# Patient Record
Sex: MALE | Race: WHITE | Employment: STUDENT | URBAN - METROPOLITAN AREA
[De-identification: names, ages, dates, MRNs, and addresses within clinical notes are randomized per-mention and may not be internally consistent; named-entity substitution may affect disease eponyms.]

---

## 2017-08-08 ENCOUNTER — TELEPHONE (OUTPATIENT)
Dept: GASTROENTEROLOGY | Facility: CLINIC | Age: 22
End: 2017-08-08

## 2017-08-08 ENCOUNTER — OFFICE VISIT (OUTPATIENT)
Dept: GASTROENTEROLOGY | Facility: CLINIC | Age: 22
End: 2017-08-08

## 2017-08-08 ENCOUNTER — LAB ENCOUNTER (OUTPATIENT)
Dept: LAB | Facility: HOSPITAL | Age: 22
End: 2017-08-08
Attending: NURSE PRACTITIONER

## 2017-08-08 VITALS
DIASTOLIC BLOOD PRESSURE: 66 MMHG | HEART RATE: 69 BPM | SYSTOLIC BLOOD PRESSURE: 111 MMHG | WEIGHT: 160.81 LBS | HEIGHT: 67 IN | BODY MASS INDEX: 25.24 KG/M2

## 2017-08-08 DIAGNOSIS — R14.2 BELCHING: ICD-10-CM

## 2017-08-08 DIAGNOSIS — K21.9 GASTROESOPHAGEAL REFLUX DISEASE, ESOPHAGITIS PRESENCE NOT SPECIFIED: ICD-10-CM

## 2017-08-08 DIAGNOSIS — R13.10 DYSPHAGIA, UNSPECIFIED TYPE: Primary | ICD-10-CM

## 2017-08-08 DIAGNOSIS — R11.2 NAUSEA AND VOMITING, INTRACTABILITY OF VOMITING NOT SPECIFIED, UNSPECIFIED VOMITING TYPE: ICD-10-CM

## 2017-08-08 DIAGNOSIS — R10.84 GENERALIZED ABDOMINAL PAIN: ICD-10-CM

## 2017-08-08 DIAGNOSIS — K92.0 HEMATEMESIS, PRESENCE OF NAUSEA NOT SPECIFIED: ICD-10-CM

## 2017-08-08 DIAGNOSIS — R19.4 ALTERED BOWEL HABITS: ICD-10-CM

## 2017-08-08 LAB
ALBUMIN SERPL BCP-MCNC: 4.8 G/DL (ref 3.5–4.8)
ALP SERPL-CCNC: 60 U/L (ref 39–325)
ALT SERPL-CCNC: 20 U/L (ref 17–63)
ANION GAP SERPL CALC-SCNC: 5 MMOL/L (ref 0–18)
AST SERPL-CCNC: 27 U/L (ref 15–41)
BASOPHILS # BLD: 0 K/UL (ref 0–0.2)
BASOPHILS NFR BLD: 1 %
BILIRUB DIRECT SERPL-MCNC: 0.1 MG/DL (ref 0–0.2)
BILIRUB SERPL-MCNC: 1 MG/DL (ref 0.3–1.2)
BUN SERPL-MCNC: 13 MG/DL (ref 8–20)
BUN/CREAT SERPL: 12.9 (ref 10–20)
CALCIUM SERPL-MCNC: 9.7 MG/DL (ref 8.5–10.5)
CHLORIDE SERPL-SCNC: 105 MMOL/L (ref 95–110)
CO2 SERPL-SCNC: 30 MMOL/L (ref 22–32)
CREAT SERPL-MCNC: 1.01 MG/DL (ref 0.5–1.5)
CRP SERPL-MCNC: 0.5 MG/DL (ref 0–0.9)
EOSINOPHIL # BLD: 0.1 K/UL (ref 0–0.7)
EOSINOPHIL NFR BLD: 1 %
ERYTHROCYTE [DISTWIDTH] IN BLOOD BY AUTOMATED COUNT: 13.2 % (ref 11–15)
ERYTHROCYTE [SEDIMENTATION RATE] IN BLOOD: 5 MM/HR (ref 0–15)
GLUCOSE SERPL-MCNC: 87 MG/DL (ref 70–99)
HCT VFR BLD AUTO: 42.4 % (ref 41–52)
HGB BLD-MCNC: 14.2 G/DL (ref 13.5–17.5)
IGA SERPL-MCNC: 79 MG/DL (ref 68–378)
LIPASE SERPL-CCNC: 24 U/L (ref 22–51)
LYMPHOCYTES # BLD: 2.7 K/UL (ref 1–4)
LYMPHOCYTES NFR BLD: 31 %
MCH RBC QN AUTO: 29.4 PG (ref 27–32)
MCHC RBC AUTO-ENTMCNC: 33.5 G/DL (ref 32–37)
MCV RBC AUTO: 87.8 FL (ref 80–100)
MONOCYTES # BLD: 0.7 K/UL (ref 0–1)
MONOCYTES NFR BLD: 8 %
NEUTROPHILS # BLD AUTO: 5.3 K/UL (ref 1.8–7.7)
NEUTROPHILS NFR BLD: 60 %
OSMOLALITY UR CALC.SUM OF ELEC: 289 MOSM/KG (ref 275–295)
PLATELET # BLD AUTO: 239 K/UL (ref 140–400)
PMV BLD AUTO: 9 FL (ref 7.4–10.3)
POTASSIUM SERPL-SCNC: 4 MMOL/L (ref 3.3–5.1)
PROT SERPL-MCNC: 7.4 G/DL (ref 5.9–8.4)
RBC # BLD AUTO: 4.83 M/UL (ref 4.5–5.9)
SODIUM SERPL-SCNC: 140 MMOL/L (ref 136–144)
WBC # BLD AUTO: 8.8 K/UL (ref 4–11)

## 2017-08-08 PROCEDURE — 80048 BASIC METABOLIC PNL TOTAL CA: CPT

## 2017-08-08 PROCEDURE — 85652 RBC SED RATE AUTOMATED: CPT

## 2017-08-08 PROCEDURE — 83516 IMMUNOASSAY NONANTIBODY: CPT

## 2017-08-08 PROCEDURE — 83690 ASSAY OF LIPASE: CPT

## 2017-08-08 PROCEDURE — 86140 C-REACTIVE PROTEIN: CPT

## 2017-08-08 PROCEDURE — 80076 HEPATIC FUNCTION PANEL: CPT

## 2017-08-08 PROCEDURE — 85025 COMPLETE CBC W/AUTO DIFF WBC: CPT

## 2017-08-08 PROCEDURE — 82784 ASSAY IGA/IGD/IGG/IGM EACH: CPT | Performed by: NURSE PRACTITIONER

## 2017-08-08 PROCEDURE — 99212 OFFICE O/P EST SF 10 MIN: CPT | Performed by: NURSE PRACTITIONER

## 2017-08-08 PROCEDURE — 99204 OFFICE O/P NEW MOD 45 MIN: CPT | Performed by: NURSE PRACTITIONER

## 2017-08-08 PROCEDURE — 36415 COLL VENOUS BLD VENIPUNCTURE: CPT

## 2017-08-08 RX ORDER — NICOTINE POLACRILEX 4 MG/1
1 GUM, CHEWING ORAL DAILY
Qty: 30 TABLET | Refills: 5 | Status: SHIPPED | OUTPATIENT
Start: 2017-08-08

## 2017-08-08 NOTE — H&P
[address] [facility] [address] Gastroenterology                                                                                                  Clinic History and Physical     Pa Edgardo late last year. An ultrasound of the abdomen was performed which per patient, was unremarkable. Patient was placed on Protonix.   He states he had side effects from this medication - he \"felt bad\" and \"passed out during practice\" and felt overal History, Medications, Allergies, ROS:      History reviewed. No pertinent past medical history.    Past Surgical History:  No date: WRIST FRACTURE SURGERY   Family Hx:   Family History   Problem Relation Age of Onset   • Colon Polyps Mother    • Colon cyanosis, clubbing or edema is evident.    Neuro: Alert and oriented x4, and patient is having movements of all 4 extremities   Psych: Pt has a normal mood and affect, behavior is normal    Nursing note and vitals reviewed    Labs/Imaging:     Patient's lab symptoms may be IBS in nature. I have advised the patient to start on the FODMAP diet and keep a food/symptom diary over the course of the next 2-3 months. Discussed celiac versus gluten sensitivity. Patient will also complete labs and stool testing.   I Omeprazole 20 MG Oral Tab EC 30 tablet 5      Sig: Take 1 tablet by mouth daily. Imaging & Referrals:  None       SAMARIA Rodriguez  8/8/2017

## 2017-08-08 NOTE — TELEPHONE ENCOUNTER
Scheduled for:  EGD 13731  Provider Name: Nina Bianchi  Date:   Mon 11/06/17  Location:  [facility]  Sedation:  MAC  Time:  9:00 am  Prep: no solids after midnight and nothing by PO 6 hrs prior to procedure  Meds/Allergies Reconciled?:  NKDA  Diagnosis with codes:  Dysph

## 2017-08-08 NOTE — PATIENT INSTRUCTIONS
1. Schedule upper endoscopy (EGD) with Dr. Joby Lucero w/ MAC  2. Complete labs and stool testing   3. Start omeprazole after completing H Pylori testing - take it first in morning on empty stomach   4. Start FODMAP diet   5.  Keep food/symptom diary for 2-3 josh

## 2017-08-09 LAB — TTG IGA SER-ACNC: <0.1 U/ML (ref ?–7)

## 2017-08-10 ENCOUNTER — TELEPHONE (OUTPATIENT)
Dept: GASTROENTEROLOGY | Facility: CLINIC | Age: 22
End: 2017-08-10

## 2017-08-10 ENCOUNTER — APPOINTMENT (OUTPATIENT)
Dept: LAB | Facility: HOSPITAL | Age: 22
End: 2017-08-10
Attending: NURSE PRACTITIONER

## 2017-08-10 DIAGNOSIS — K21.9 GASTROESOPHAGEAL REFLUX DISEASE, ESOPHAGITIS PRESENCE NOT SPECIFIED: ICD-10-CM

## 2017-08-10 DIAGNOSIS — R14.2 BELCHING: ICD-10-CM

## 2017-08-10 DIAGNOSIS — R11.2 NAUSEA AND VOMITING, INTRACTABILITY OF VOMITING NOT SPECIFIED, UNSPECIFIED VOMITING TYPE: ICD-10-CM

## 2017-08-10 DIAGNOSIS — R10.84 GENERALIZED ABDOMINAL PAIN: ICD-10-CM

## 2017-08-10 PROCEDURE — 87338 HPYLORI STOOL AG IA: CPT

## 2017-08-10 PROCEDURE — 83993 ASSAY FOR CALPROTECTIN FECAL: CPT

## 2017-08-10 NOTE — TELEPHONE ENCOUNTER
----- Message from SAMARIA Olguin sent at 8/9/2017  2:12 PM CDT -----  Nursing: Please let the patient know that his lab work came back negative for celiac, infection, or inflammation.   Will advise on the stool testing once those results have been rec

## 2017-08-11 LAB
CALPROTECTIN, FECAL: <16 UG/G
HELICOBACTER PYLORI AG, FECAL: NEGATIVE

## 2017-08-14 ENCOUNTER — TELEPHONE (OUTPATIENT)
Dept: GASTROENTEROLOGY | Facility: CLINIC | Age: 22
End: 2017-08-14

## 2017-08-22 ENCOUNTER — TELEPHONE (OUTPATIENT)
Dept: GASTROENTEROLOGY | Facility: CLINIC | Age: 22
End: 2017-08-22

## 2017-08-22 NOTE — TELEPHONE ENCOUNTER
Pt's mother returned my call, stating that her son is on the road.  I informed Alissa Dudley, pt's mother, that Tyrone's lab results came back normal.   Ms Alissa Dudley stated that her son only took Omeprazole for two days because it gave him fever and fatigue, and th

## 2017-08-22 NOTE — TELEPHONE ENCOUNTER
I called and left a voicemail message for pt to return my call regarding his lab results.     Alirio Goff

## 2017-08-22 NOTE — TELEPHONE ENCOUNTER
----- Message from SAMARIA Bagley sent at 8/14/2017  1:54 PM CDT -----  Nursing: Please let the patient know that his stool testing putting H pylori and fecal calprotectin were negative.   These would have indicated an infection in the stomach or infla

## 2017-08-31 NOTE — TELEPHONE ENCOUNTER
As Sary Barbosa is out of the office presently, I suggest Ranitidine. Patient seems to have \"side effects\" to Omeprazole and Pantoprazole. These are not common side effects of these medications. He can try this over the counter regimen.

## 2017-08-31 NOTE — TELEPHONE ENCOUNTER
Contacted pt mother, Veronica Hammonds, and reviewed PB's message below, she verbalized understanding and will purchase OTC ranitidine for the pt. No further questions or concerns at this time.

## 2017-09-01 ENCOUNTER — TELEPHONE (OUTPATIENT)
Dept: GASTROENTEROLOGY | Facility: CLINIC | Age: 22
End: 2017-09-01

## 2017-09-01 ENCOUNTER — HOSPITAL ENCOUNTER (EMERGENCY)
Facility: HOSPITAL | Age: 22
Discharge: HOME | End: 2017-09-01
Attending: EMERGENCY MEDICINE

## 2017-09-01 VITALS
HEART RATE: 63 BPM | DIASTOLIC BLOOD PRESSURE: 96 MMHG | BODY MASS INDEX: 25.11 KG/M2 | TEMPERATURE: 99 F | WEIGHT: 160 LBS | RESPIRATION RATE: 14 BRPM | OXYGEN SATURATION: 98 % | HEIGHT: 67 IN | SYSTOLIC BLOOD PRESSURE: 134 MMHG

## 2017-09-01 DIAGNOSIS — R10.13 ABDOMINAL PAIN, EPIGASTRIC: Primary | ICD-10-CM

## 2017-09-01 LAB
ANION GAP SERPL CALC-SCNC: 8 MMOL/L (ref 0–18)
BASOPHILS # BLD: 0 K/UL (ref 0–0.2)
BASOPHILS NFR BLD: 0 %
BUN SERPL-MCNC: 15 MG/DL (ref 8–20)
BUN/CREAT SERPL: 13.8 (ref 10–20)
CALCIUM SERPL-MCNC: 9.8 MG/DL (ref 8.5–10.5)
CHLORIDE SERPL-SCNC: 102 MMOL/L (ref 95–110)
CO2 SERPL-SCNC: 30 MMOL/L (ref 22–32)
CREAT SERPL-MCNC: 1.09 MG/DL (ref 0.5–1.5)
EOSINOPHIL # BLD: 0 K/UL (ref 0–0.7)
EOSINOPHIL NFR BLD: 0 %
ERYTHROCYTE [DISTWIDTH] IN BLOOD BY AUTOMATED COUNT: 12.9 % (ref 11–15)
GLUCOSE SERPL-MCNC: 98 MG/DL (ref 70–99)
HCT VFR BLD AUTO: 41.5 % (ref 41–52)
HGB BLD-MCNC: 14.1 G/DL (ref 13.5–17.5)
LIPASE SERPL-CCNC: 24 U/L (ref 22–51)
LYMPHOCYTES # BLD: 1.9 K/UL (ref 1–4)
LYMPHOCYTES NFR BLD: 21 %
MCH RBC QN AUTO: 29.3 PG (ref 27–32)
MCHC RBC AUTO-ENTMCNC: 34 G/DL (ref 32–37)
MCV RBC AUTO: 86.1 FL (ref 80–100)
MONOCYTES # BLD: 0.8 K/UL (ref 0–1)
MONOCYTES NFR BLD: 8 %
NEUTROPHILS # BLD AUTO: 6.7 K/UL (ref 1.8–7.7)
NEUTROPHILS NFR BLD: 71 %
OSMOLALITY UR CALC.SUM OF ELEC: 291 MOSM/KG (ref 275–295)
PLATELET # BLD AUTO: 233 K/UL (ref 140–400)
PMV BLD AUTO: 8.6 FL (ref 7.4–10.3)
POTASSIUM SERPL-SCNC: 3.7 MMOL/L (ref 3.3–5.1)
RBC # BLD AUTO: 4.81 M/UL (ref 4.5–5.9)
SODIUM SERPL-SCNC: 140 MMOL/L (ref 136–144)
TROPONIN I SERPL-MCNC: 0.01 NG/ML (ref ?–0.03)
WBC # BLD AUTO: 9.5 K/UL (ref 4–11)

## 2017-09-01 PROCEDURE — 93005 ELECTROCARDIOGRAM TRACING: CPT

## 2017-09-01 PROCEDURE — 83690 ASSAY OF LIPASE: CPT | Performed by: EMERGENCY MEDICINE

## 2017-09-01 PROCEDURE — 99284 EMERGENCY DEPT VISIT MOD MDM: CPT

## 2017-09-01 PROCEDURE — 93010 ELECTROCARDIOGRAM REPORT: CPT | Performed by: EMERGENCY MEDICINE

## 2017-09-01 PROCEDURE — 80048 BASIC METABOLIC PNL TOTAL CA: CPT

## 2017-09-01 PROCEDURE — 36415 COLL VENOUS BLD VENIPUNCTURE: CPT

## 2017-09-01 PROCEDURE — 80048 BASIC METABOLIC PNL TOTAL CA: CPT | Performed by: EMERGENCY MEDICINE

## 2017-09-01 PROCEDURE — 85025 COMPLETE CBC W/AUTO DIFF WBC: CPT | Performed by: EMERGENCY MEDICINE

## 2017-09-01 PROCEDURE — 85025 COMPLETE CBC W/AUTO DIFF WBC: CPT

## 2017-09-01 PROCEDURE — 84484 ASSAY OF TROPONIN QUANT: CPT | Performed by: EMERGENCY MEDICINE

## 2017-09-01 PROCEDURE — 84484 ASSAY OF TROPONIN QUANT: CPT

## 2017-09-01 RX ORDER — MAGNESIUM HYDROXIDE/ALUMINUM HYDROXICE/SIMETHICONE 120; 1200; 1200 MG/30ML; MG/30ML; MG/30ML
30 SUSPENSION ORAL ONCE
Status: COMPLETED | OUTPATIENT
Start: 2017-09-01 | End: 2017-09-01

## 2017-09-01 RX ORDER — ONDANSETRON 2 MG/ML
4 INJECTION INTRAMUSCULAR; INTRAVENOUS ONCE
Status: DISCONTINUED | OUTPATIENT
Start: 2017-09-01 | End: 2017-09-01

## 2017-09-01 RX ORDER — FAMOTIDINE 20 MG/1
20 TABLET ORAL ONCE
Status: COMPLETED | OUTPATIENT
Start: 2017-09-01 | End: 2017-09-01

## 2017-09-01 NOTE — ED INITIAL ASSESSMENT (HPI)
CP that started tonight- 2hrs pta, patient denies any trauma-  +SOB, pain is reproduceable with inhalation.   Worse after eating pizza tonight

## 2017-09-01 NOTE — ED PROVIDER NOTES
Patient Seen in: [facility] AND [facility] Emergency Department    History   Patient presents with:  Chest Pain Angina (cardiovascular)  Abdomen/Flank Pain (GI/)    Stated Complaint: chest pain    HPI    Patient is a 45-year-old male who presents to the emerge are normal. Pupils are equal, round, and reactive to light. Neck: Neck supple. Cardiovascular: Normal rate, regular rhythm and normal heart sounds. Pulmonary/Chest: Effort normal.   Abdominal: Soft.  Normal appearance and bowel sounds are normal. He [address] [place] [address]  [phone]    Schedule an appointment as soon as possible for a visit        Medications Prescribed:  Current Discharge Medication List

## 2017-09-01 NOTE — TELEPHONE ENCOUNTER
Pts mother Madai Daley states that pt was in ER at [address] [place] [address] last night due to abdominal pain and breathing issues and was told that he can't wait until 11/6/17 to have EGD. He was also advised to have colonoscopy done at the same time. Please call.

## 2017-09-01 NOTE — TELEPHONE ENCOUNTER
Dr. Marj Chacko -  Would you like pt to have ER follow-up OV with you, Dax or move up EGD (add colon?)    1) Pt had OV with  [address] on 8.8. 17.  See notes  2) Pt was in ER this morning for upper GI symptoms

## 2017-09-01 NOTE — TELEPHONE ENCOUNTER
FLORINDA/CSS - ok to transfer to RNs. When I phoned mom back at # provided, it was a work line. General message left to call back. Phoned pt and he stated verbal \"ok to talk to mom\". GI RNs - Pls see below. 1) I scheduled pt with Burke Reed for Wed 9.

## 2017-09-01 NOTE — TELEPHONE ENCOUNTER
RN to contact, note from Coco reviewed. Ok to move up the EGD but why does the pt need colonoscopy -  OK to have pt see Coco next week, ok to add to her schedule.

## 2017-11-06 ENCOUNTER — TELEPHONE (OUTPATIENT)
Dept: GASTROENTEROLOGY | Facility: CLINIC | Age: 22
End: 2017-11-06

## 2017-11-06 NOTE — TELEPHONE ENCOUNTER
Spoke with Bj in [place] that pt is cancelling. Removed from EMR        I called pt this morning. He does not want to reschedule at this time. He states issues with insurance and planning on following up with provider closer to his home in [place].     FYI

## 2017-11-06 NOTE — TELEPHONE ENCOUNTER
Noted.  Arabella Warren to send patient's records to his new gastroenterology office when he establishes.

## 2019-01-15 ENCOUNTER — APPOINTMENT (OUTPATIENT)
Dept: GENERAL RADIOLOGY | Facility: HOSPITAL | Age: 24
End: 2019-01-15
Attending: PHYSICIAN ASSISTANT
Payer: COMMERCIAL

## 2019-01-15 ENCOUNTER — HOSPITAL ENCOUNTER (EMERGENCY)
Facility: HOSPITAL | Age: 24
Discharge: HOME | End: 2019-01-15
Payer: COMMERCIAL

## 2019-01-15 VITALS
DIASTOLIC BLOOD PRESSURE: 72 MMHG | TEMPERATURE: 98 F | WEIGHT: 155 LBS | OXYGEN SATURATION: 97 % | HEART RATE: 70 BPM | HEIGHT: 67 IN | BODY MASS INDEX: 24.33 KG/M2 | RESPIRATION RATE: 16 BRPM | SYSTOLIC BLOOD PRESSURE: 122 MMHG

## 2019-01-15 DIAGNOSIS — R07.89 CHEST WALL PAIN: Primary | ICD-10-CM

## 2019-01-15 LAB
ANION GAP SERPL CALC-SCNC: 13 MMOL/L (ref 0–18)
BASOPHILS # BLD: 0 K/UL (ref 0–0.2)
BASOPHILS NFR BLD: 1 %
BUN SERPL-MCNC: 13 MG/DL (ref 8–20)
BUN/CREAT SERPL: 13.4 (ref 10–20)
CALCIUM SERPL-MCNC: 9.6 MG/DL (ref 8.5–10.5)
CHLORIDE SERPL-SCNC: 99 MMOL/L (ref 95–110)
CO2 SERPL-SCNC: 26 MMOL/L (ref 22–32)
CREAT SERPL-MCNC: 0.97 MG/DL (ref 0.5–1.5)
D DIMER PPP FEU-MCNC: <0.27 MCG/ML (ref ?–0.5)
EOSINOPHIL # BLD: 0 K/UL (ref 0–0.7)
EOSINOPHIL NFR BLD: 0 %
ERYTHROCYTE [DISTWIDTH] IN BLOOD BY AUTOMATED COUNT: 12.7 % (ref 11–15)
GLUCOSE SERPL-MCNC: 92 MG/DL (ref 70–99)
HCT VFR BLD AUTO: 42.2 % (ref 41–52)
HGB BLD-MCNC: 14.3 G/DL (ref 13.5–17.5)
LYMPHOCYTES # BLD: 1.6 K/UL (ref 1–4)
LYMPHOCYTES NFR BLD: 21 %
MCH RBC QN AUTO: 29.6 PG (ref 27–32)
MCHC RBC AUTO-ENTMCNC: 33.9 G/DL (ref 32–37)
MCV RBC AUTO: 87.1 FL (ref 80–100)
MONOCYTES # BLD: 0.7 K/UL (ref 0–1)
MONOCYTES NFR BLD: 9 %
NEUTROPHILS # BLD AUTO: 5.3 K/UL (ref 1.8–7.7)
NEUTROPHILS NFR BLD: 69 %
OSMOLALITY UR CALC.SUM OF ELEC: 286 MOSM/KG (ref 275–295)
PLATELET # BLD AUTO: 232 K/UL (ref 140–400)
PMV BLD AUTO: 8.2 FL (ref 7.4–10.3)
POTASSIUM SERPL-SCNC: 3.9 MMOL/L (ref 3.3–5.1)
RBC # BLD AUTO: 4.84 M/UL (ref 4.5–5.9)
SODIUM SERPL-SCNC: 138 MMOL/L (ref 136–144)
TROPONIN I SERPL-MCNC: 0 NG/ML (ref ?–0.03)
WBC # BLD AUTO: 7.6 K/UL (ref 4–11)

## 2019-01-15 PROCEDURE — 99285 EMERGENCY DEPT VISIT HI MDM: CPT

## 2019-01-15 PROCEDURE — 36415 COLL VENOUS BLD VENIPUNCTURE: CPT

## 2019-01-15 PROCEDURE — 93005 ELECTROCARDIOGRAM TRACING: CPT

## 2019-01-15 PROCEDURE — 85025 COMPLETE CBC W/AUTO DIFF WBC: CPT

## 2019-01-15 PROCEDURE — 84484 ASSAY OF TROPONIN QUANT: CPT

## 2019-01-15 PROCEDURE — 85379 FIBRIN DEGRADATION QUANT: CPT | Performed by: NURSE PRACTITIONER

## 2019-01-15 PROCEDURE — 80048 BASIC METABOLIC PNL TOTAL CA: CPT

## 2019-01-15 PROCEDURE — 71046 X-RAY EXAM CHEST 2 VIEWS: CPT | Performed by: PHYSICIAN ASSISTANT

## 2019-01-15 PROCEDURE — 93010 ELECTROCARDIOGRAM REPORT: CPT | Performed by: PHYSICIAN ASSISTANT

## 2019-01-15 RX ORDER — POLYETHYLENE GLYCOL 3350 17 G/17G
17 POWDER, FOR SOLUTION ORAL DAILY PRN
Qty: 12 EACH | Refills: 0 | Status: SHIPPED | OUTPATIENT
Start: 2019-01-15 | End: 2019-01-15 | Stop reason: CLARIF

## 2019-01-15 NOTE — ED PROVIDER NOTES
Patient Seen in: [facility] AND [facility] Emergency Department    History   Patient presents with:  Chest Pain Angina (cardiovascular)  Dyspnea SIRIA SOB (respiratory)    Stated Complaint:     HPI    Patient complains of intermittent chest tighness, and sob that chest pain, no tenderness on palpation   GI: abdomen is soft and non tender, no masses, nl bowel sounds   EXTREMITIES: from, 5/5 strength in all 4 ext, no edema  NEURO: alert and oriented x3, 2-12 intact, no focal deficit noted  SKIN: good skin turgor, no List

## 2019-01-15 NOTE — ED NOTES
Pt to ER with c/o left side chest pain for last 2 weeks. Pt states intermittent non productive cough. Pt states +pain with inspiration. Pt denies fever. No respiratory distress noted. 100% on room air. Lungs clear bilaterally.  Pt states recent flight to [facility]

## 2019-01-15 NOTE — ED INITIAL ASSESSMENT (HPI)
Chest tightness since Thursday, worse with exertion  KRISHNAMURTHY with talking and playing sports  No cardiac hx

## 2019-12-16 NOTE — TELEPHONE ENCOUNTER
----- Message from SAMARIA Jin sent at 8/14/2017  1:54 PM CDT -----  Nursing: Please let the patient know that his stool testing putting H pylori and fecal calprotectin were negative.   These would have indicated an infection in the stomach or infla Yes

## 2024-06-06 ENCOUNTER — TELEPHONE (OUTPATIENT)
Dept: OTHER | Age: 29
End: 2024-06-06

## 2024-07-02 ENCOUNTER — OFFICE VISIT (OUTPATIENT)
Dept: OTOLARYNGOLOGY | Facility: CLINIC | Age: 29
End: 2024-07-02
Payer: COMMERCIAL

## 2024-07-02 DIAGNOSIS — J34.2 DEVIATED NASAL SEPTUM: Primary | ICD-10-CM

## 2024-07-02 DIAGNOSIS — R07.0 THROAT PAIN: ICD-10-CM

## 2024-07-02 PROCEDURE — 31575 DIAGNOSTIC LARYNGOSCOPY: CPT | Performed by: OTOLARYNGOLOGY

## 2024-07-02 PROCEDURE — 99203 OFFICE O/P NEW LOW 30 MIN: CPT | Performed by: OTOLARYNGOLOGY

## 2024-07-02 RX ORDER — AZELASTINE 1 MG/ML
2 SPRAY, METERED NASAL 2 TIMES DAILY
Qty: 30 ML | Refills: 0 | Status: SHIPPED | OUTPATIENT
Start: 2024-07-02

## 2024-07-02 RX ORDER — BUSPIRONE HYDROCHLORIDE 5 MG/1
TABLET ORAL AS DIRECTED
COMMUNITY
Start: 2022-05-25

## 2024-07-02 RX ORDER — MONTELUKAST SODIUM 10 MG/1
10 TABLET ORAL NIGHTLY
Qty: 30 TABLET | Refills: 3 | Status: SHIPPED | OUTPATIENT
Start: 2024-07-02

## 2024-07-02 RX ORDER — OMEPRAZOLE 40 MG/1
40 CAPSULE, DELAYED RELEASE ORAL DAILY
Qty: 30 CAPSULE | Refills: 2 | Status: SHIPPED | OUTPATIENT
Start: 2024-07-02

## 2024-07-02 NOTE — PROGRESS NOTES
Tyrone Jordan is a 29 year old male.    Chief Complaint   Patient presents with    Throat Problem     Patient reports issues swallowing x 1 year. 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  He is unclear where the stricture was as this was done in [place].  No problems since that time up until about a year ago when he started having sensations of difficulty swallowing.  Feels like it is hard to move the food through his throat but it does pass.  He does clear his throat significantly with no significant coughing.  He was on omeprazole 20 mg in the past but no longer using it.  He does admit to occasional GE RD issues especially when he has certain types of beer.  He did play lacrosse and states that he has injured his nose on multiple occasions and noticed that he most likely has a deviated septum to the left.  Some difficulty breathing at times.  He does admit to congestion and postnasal drip      Social History     Socioeconomic History    Marital status:    Tobacco Use    Smoking status: Former     Types: Cigarettes    Smokeless tobacco: Never   Vaping Use    Vaping status: Former   Substance and Sexual Activity    Alcohol use: No    Drug use: Not Currently     Types: Cannabis       Family History   Problem Relation Age of Onset    Colon Polyps Mother     Colon Cancer Paternal Grandmother        History reviewed. No pertinent past medical history.    Past Surgical History:   Procedure Laterality Date    Wrist fracture surgery           REVIEW OF SYSTEMS    System Neg/Pos Details   Constitutional Negative Fatigue, fever and weight loss.   ENMT Negative Drooling.   Eyes Negative Blurred  vision and vision changes.   Respiratory Negative Dyspnea and wheezing.   Cardio Negative Chest pain, irregular heartbeat/palpitations and syncope.   GI Negative Abdominal pain and diarrhea.   Endocrine Negative Cold intolerance and heat intolerance.   Neuro Negative Tremors.   Psych Negative Anxiety and depression.   Integumentary Negative Frequent skin infections, pigment change and rash.   Hema/Lymph Negative Easy bleeding and easy bruising.           PHYSICAL EXAM    There were no vitals taken for this visit.       Constitutional Normal Overall appearance - Normal.   Psychiatric Normal Orientation - Oriented to time, place, person & situation. Appropriate mood and affect.   Neck Exam Normal Inspection - Normal. Palpation - Normal. Parotid gland - Normal. Thyroid gland - Normal.   Eyes Normal Conjunctiva - Right: Normal, Left: Normal. Pupil - Right: Normal, Left: Normal. Fundus - Right: Normal, Left: Normal.   Neurological Normal Memory - Normal. Cranial nerves - Cranial nerves II through XII grossly intact.   Head/Face Normal Facial features - Normal. Eyebrows - Normal. Skull - Normal.        Nasopharynx Normal External nose - Normal. Lips/teeth/gums - Normal. Tonsils - Normal. Oropharynx - Normal.   Ears Normal Inspection - Right: Normal, Left: Normal. Canal - Right: Normal, Left: Normal. TM - Right: Normal, Left: Normal.   Skin Normal Inspection - Normal.        Lymph Detail Normal Submental. Submandibular. Anterior cervical. Posterior cervical. Supraclavicular.        Nose/Mouth/Throat Normal External nose - Normal. Lips/teeth/gums - Normal. Tonsils - Normal. Oropharynx - Normal.   Nose/Mouth/Throat Normal Nares - Right: Normal Left: Normal. Septum -deviated to the left turbinates - Right: Normal, Left: Normal.  Nasal mucosa-congested bilaterally   Procedures:  Endoscopy/Laryngoscopy  Pre-Procedure Care: Verbal consent was obtained. Procedure/risks were explained. Questions were answered. Correct patient  identified. Correct side and site confirmed.      A topical spray of ).25% Neosynephrine was sprayed into the nose.    Laryngoscopy:  Flexible Fiberoptic Laryngoscopy: A diagnostic flexible fiberoptic laryngoscopy was performed. The flexible fiberoptic laryngoscope was placed into the nose or mouthand advanced  into the interior of the larynx. A thorough examination of the interior of the larynx was performed.   Findings were as follows.       Hypopharynx/Larynx:  Epiglottis is normal.  Arytenoids:  Bilateral: Arytenoids are normal.  Vocal folds-false  Bilateral: Vocal folds (false) are normal.  Vocal folds-true  Bilateral: Vocal folds (true) are normal.  Pyriform sinus:  Bilateral: Pyriform sinuses are normal.  Base of tongue is normal in appearance.  There is no airway obstruction.   General comments: Deviated septum to the left nasal mucosal congestion with postnasal discharge.  Erythema of the laryngeal structures.  No lesions masses polyps or nodules present              Current Outpatient Medications:     busPIRone 5 MG Oral Tab, Take by mouth As Directed., Disp: , Rfl:     Omeprazole 20 MG Oral Tab EC, Take 1 tablet by mouth daily. (Patient not taking: Reported on 7/2/2024), Disp: 30 tablet, Rfl: 5  ASSESSMENT AND PLAN    1. Deviated nasal septum    2. Throat pain  Deviated septum to the left significant nasal mucosal congestion postnasal discharge with erythema of the laryngeal structures.  No lesions masses polyps or nodules of the larynx.  I did recommend that he trial Singulair Loratadine-D Astelin nasal spray and I will put him back on a PPI with omeprazole 40 before his biggest meal of the day.  Return to see me in 1 month.  If no improvement will highly recommend an esophagram to rule out a recurrent stricture of the esophagus        This note was prepared using Dragon Medical voice recognition dictation software. As a result errors may occur. When identified these errors have been corrected. While  every attempt is made to correct errors during dictation discrepancies may still exist    Evelio Loera MD    7/2/2024    9:19 AM

## 2024-07-10 ENCOUNTER — PATIENT MESSAGE (OUTPATIENT)
Dept: OTOLARYNGOLOGY | Facility: CLINIC | Age: 29
End: 2024-07-10

## 2024-07-10 NOTE — TELEPHONE ENCOUNTER
From: Tyrone Jordan  To: Evelio Loera  Sent: 7/10/2024 3:24 PM CDT  Subject: Singulair Side Effects    Hello Dr. Loera,    I have been taking singulair at night for a few nights now and have not been able to sleep. I have been restless, waking up throughout the night, hot flashes, sweating and overall insomnia. I don’t think I should continue taking it at night considering the lack of sleep. What is your recommendation?     Thanks,  Tyrone

## 2024-07-10 NOTE — TELEPHONE ENCOUNTER
Called and spoke to patient, he reports since he started the Montelukast, took for 3 nights, he has been restless, not able to sleep, hot flashes, sweating. He is going to stop taking it.    Dr. Loera, please review and advise, your recommendations for replacement for Montelukast. Thank you.

## 2024-07-15 NOTE — TELEPHONE ENCOUNTER
If the medication only bothered him with his sleep he can try to take it in the morning.  Otherwise I agree that he should simply just stop the medication.  Unfortunately there is no other alternative to this medication.  Continue with all other meds.

## 2024-08-09 RX ORDER — AZELASTINE 1 MG/ML
2 SPRAY, METERED NASAL 2 TIMES DAILY
Qty: 30 ML | Refills: 1 | Status: SHIPPED | OUTPATIENT
Start: 2024-08-09

## 2024-08-13 ENCOUNTER — OFFICE VISIT (OUTPATIENT)
Dept: OTOLARYNGOLOGY | Facility: CLINIC | Age: 29
End: 2024-08-13
Payer: COMMERCIAL

## 2024-08-13 DIAGNOSIS — K22.2 ESOPHAGEAL STRICTURE: Primary | ICD-10-CM

## 2024-08-13 DIAGNOSIS — J34.2 DEVIATED NASAL SEPTUM: ICD-10-CM

## 2024-08-13 DIAGNOSIS — R07.0 THROAT PAIN: ICD-10-CM

## 2024-08-13 PROCEDURE — 99213 OFFICE O/P EST LOW 20 MIN: CPT | Performed by: OTOLARYNGOLOGY

## 2024-08-13 NOTE — PROGRESS NOTES
Tyrone Jordan is a 29 year old male.    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 He is unclear where the stricture was as this was done in [place]. No problems since that time up until about a year ago when he started having sensations of difficulty swallowing. Feels like it is hard to move the food through his throat but it does pass. He does clear his throat significantly with no significant coughing. He was on omeprazole 20 mg in the past but no longer using it. He does admit to occasional GE RD issues especially when he has certain types of beer. He did play lacrosse and states that he has injured his nose on multiple occasions and noticed that he most likely has a deviated septum to the left. Some difficulty breathing at times. He does admit to congestion and postnasal drip     8/13/24 today he brings to me information from previous EGD performed at [facility] in 2018.  This demonstrated a stricture at the GE junction which was dilated.  Occasional episodes of feeling like his food gets caught but since I started him on Claritin-D Singulair Astelin nasal spray and omeprazole he has had 80% resolution of his throat symptoms.  Food seems to pass normally at the level of the throat.  No other signs, symptoms or complaints.  Has some bad dreams and othe side effects of the Singulair therefore is only using Claritin-D Astelin and omeprazole does not feel that he really has any issues with GE RD  Social History     Socioeconomic History    Marital status:    Tobacco Use    Smoking status: Former     Types: Cigarettes    Smokeless tobacco: Never   Vaping Use    Vaping status: Former   Substance  and Sexual Activity    Alcohol use: No    Drug use: Not Currently     Types: Cannabis       Family History   Problem Relation Age of Onset    Colon Polyps Mother     Colon Cancer Paternal Grandmother        History reviewed. No pertinent past medical history.    Past Surgical History:   Procedure Laterality Date    Wrist fracture surgery           REVIEW OF SYSTEMS    System Neg/Pos Details   Constitutional Negative Fatigue, fever and weight loss.   ENMT Negative Drooling.   Eyes Negative Blurred vision and vision changes.   Respiratory Negative Dyspnea and wheezing.   Cardio Negative Chest pain, irregular heartbeat/palpitations and syncope.   GI Negative Abdominal pain and diarrhea.   Endocrine Negative Cold intolerance and heat intolerance.   Neuro Negative Tremors.   Psych Negative Anxiety and depression.   Integumentary Negative Frequent skin infections, pigment change and rash.   Hema/Lymph Negative Easy bleeding and easy bruising.           PHYSICAL EXAM    There were no vitals taken for this visit.       Constitutional Normal Overall appearance - Normal.   Psychiatric Normal Orientation - Oriented to time, place, person & situation. Appropriate mood and affect.   Neck Exam Normal Inspection - Normal. Palpation - Normal. Parotid gland - Normal. Thyroid gland - Normal.   Eyes Normal Conjunctiva - Right: Normal, Left: Normal. Pupil - Right: Normal, Left: Normal. Fundus - Right: Normal, Left: Normal.   Neurological Normal Memory - Normal. Cranial nerves - Cranial nerves II through XII grossly intact.   Head/Face Normal Facial features - Normal. Eyebrows - Normal. Skull - Normal.        Nasopharynx Normal External nose - Normal. Lips/teeth/gums - Normal. Tonsils - Normal. Oropharynx - Normal.   Ears Normal Inspection - Right: Normal, Left: Normal. Canal - Right: Normal, Left: Normal. TM - Right: Normal, Left: Normal.   Skin Normal Inspection - Normal.        Lymph Detail Normal Submental. Submandibular. Anterior  cervical. Posterior cervical. Supraclavicular.        Nose/Mouth/Throat Normal External nose - Normal. Lips/teeth/gums - Normal. Tonsils - Normal. Oropharynx - Normal.   Nose/Mouth/Throat Normal Nares - Right: Normal Left: Normal. Septum -deviated to the left turbinates - Right: Normal, Left: Normal.       Current Outpatient Medications:     azelastine 0.1 % Nasal Solution, USE 2 SPRAYS IN EACH NOSTRIL TWICE DAILY, Disp: 30 mL, Rfl: 1    busPIRone 5 MG Oral Tab, Take by mouth As Directed., Disp: , Rfl:     montelukast 10 MG Oral Tab, Take 1 tablet (10 mg total) by mouth nightly., Disp: 30 tablet, Rfl: 3    loratadine-pseudoephedrine ER 5-120 MG Oral Tablet 12 Hr, Take 1 tablet by mouth every 12 (twelve) hours., Disp: 60 tablet, Rfl: 3    Omeprazole 40 MG Oral Capsule Delayed Release, Take 1 capsule (40 mg total) by mouth daily. Before a meal, Disp: 30 capsule, Rfl: 2    Omeprazole 20 MG Oral Tab EC, Take 1 tablet by mouth daily., Disp: 30 tablet, Rfl: 5  ASSESSMENT AND PLAN    1. Esophageal stricture  - XR UGI/ESOPHAGUS DOUBLE CONTRAST (CPT=74246); Future    2. Deviated nasal septum    3. Throat pain  I did review his paperwork from outside facility with EGD performed at [facility] about 6 years ago.  He did have an esophageal stricture at the GE junction at that time.  He does occasionally have some issues where he feels the food gets caught so I have recommended esophagram and if abnormal I will highly recommend that he meet with one of our GI doctors.  In addition I did ask him to stop the omeprazole as he does not feel like he is having any GI issues and to continue with the Claritin-D and spray as a seem to have gotten him to about 80% better.  Continue meds for now return to see me in about a month or so.  Does not seem to be bothered his deviated septum on the left.        This note was prepared using Dragon Medical voice recognition dictation software. As a result errors may occur. When  identified these errors have been corrected. While every attempt is made to correct errors during dictation discrepancies may still exist    Evelio Loera MD    8/13/2024    8:59 AM

## 2024-10-02 ENCOUNTER — HOSPITAL ENCOUNTER (OUTPATIENT)
Age: 29
Discharge: HOME | End: 2024-10-02
Attending: STUDENT IN AN ORGANIZED HEALTH CARE EDUCATION/TRAINING PROGRAM
Payer: COMMERCIAL

## 2024-10-02 VITALS
OXYGEN SATURATION: 100 % | RESPIRATION RATE: 18 BRPM | TEMPERATURE: 97 F | DIASTOLIC BLOOD PRESSURE: 97 MMHG | SYSTOLIC BLOOD PRESSURE: 148 MMHG | HEART RATE: 92 BPM

## 2024-10-02 DIAGNOSIS — S61.011A LACERATION OF RIGHT THUMB WITHOUT FOREIGN BODY WITHOUT DAMAGE TO NAIL, INITIAL ENCOUNTER: Primary | ICD-10-CM

## 2024-10-02 PROCEDURE — 12002 RPR S/N/AX/GEN/TRNK2.6-7.5CM: CPT

## 2024-10-02 PROCEDURE — 99213 OFFICE O/P EST LOW 20 MIN: CPT

## 2024-10-02 RX ORDER — LIDOCAINE HYDROCHLORIDE 10 MG/ML
1 INJECTION, SOLUTION EPIDURAL; INFILTRATION; INTRACAUDAL; PERINEURAL ONCE
Status: COMPLETED | OUTPATIENT
Start: 2024-10-02 | End: 2024-10-02

## 2024-10-02 NOTE — DISCHARGE INSTRUCTIONS
2 sutures were placed.  Do not get them wet for 24 hours.  Never submerge them in water.  If there is any wound dehiscence with reopening and bleeding you should be immediately reassessed.  With any signs of infection such as spreading redness, purulence, or fevers I recommend immediate assessment as all wounds are at risk for infection.  You declined a tetanus shot at this time.    You should follow-up with your primary care physician in 10 to 14 days for assessment for suture removal.  If the site appears to be healing sooner, you should be immediately reassessed for possible early removal.    You can wear a Band-Aid as needed to help keep the site covered during the day to prevent contamination.  Change as frequently as needed if contaminated or dirty.  Leave the site open at nighttime to prevent wound suffocation.

## 2024-10-02 NOTE — ED INITIAL ASSESSMENT (HPI)
Patient arrives ambulatory with c/o cutting right thumb with a butter knife 1hr PTA. Patient reports last tetanus was within 10 years.

## 2024-10-02 NOTE — ED PROVIDER NOTES
Patient Seen in: [facility]      History     Chief Complaint   Patient presents with    Laceration/Abrasion     Stated Complaint: Wound Care - Sliced thumb.    Subjective:   HPI    29-year-old male with previous right hand surgery following a wrist fracture, otherwise no past medical history, who presents with concern for injury to the right first digit while using a butter knife in the sink.  He states he was cleaning it, and while cleaning it with a sponge had a laceration occur.  He rinsed it under the sink and came here.  Injury occurred about an hour ago.  Last tetanus was 9 years ago.  Not on any blood thinners.    Objective:     History reviewed. No pertinent past medical history.           Past Surgical History:   Procedure Laterality Date    Wrist fracture surgery                  Social History     Socioeconomic History    Marital status:    Tobacco Use    Smoking status: Former     Types: Cigarettes    Smokeless tobacco: Never   Vaping Use    Vaping status: Former   Substance and Sexual Activity    Alcohol use: No    Drug use: Not Currently     Types: Cannabis              Physical Exam     ED Triage Vitals [10/02/24 1711]   BP (!) 148/97   Pulse 92   Resp 18   Temp 97.4 °F (36.3 °C)   Temp src Temporal   SpO2 100 %   O2 Device None (Room air)       Current Vitals:   Vital Signs  BP: (!) 148/97  Pulse: 92  Resp: 18  Temp: 97.4 °F (36.3 °C)  Temp src: Temporal    Oxygen Therapy  SpO2: 100 %  O2 Device: None (Room air)        Physical Exam    General: Awake, alert, comfortable on room air, in no distress, tolerating oral secretions, interactive  Pulmonary: no conversational dyspnea  Neuro:  intact sensation and motor function of B/L radial, median, and ulnar nerves, symmetrical facial expressions on gross observation  Musculoskeletal: Intact active flexion and extension of the right first MCP and interphalangeal joint, please see skin assessment regarding patient's laceration  HEENT: no  periorbital edema or erythema  Skin: Approximately 3 cm linear laceration just superior to the right volar first MCP region, no active bleeding, no pulsatile bleeding, with range of motion there is oozing, no sign of deep structure involvement, no contamination  Psych: Normal mood, normal affect    ED Course     After discussing indications, risks, potential complications, and alternatives, patient consented to primary laceration repair with sutures. Denies any medication allergies other than to Reglan. All Laceration repairs performed using sterile gloves, sterile equipment, and sterile field.    Location: Just superior to the right volar first MCP region  Appearance: No foreign body, No deeper structures or joint involvement  Local Anesthesia: 1mL lidocaine injected along wound edges  Irrigation: Under pressure with 500 mL NSS  Suture: Two 4-0 ethilon sutures placed  Closure: Primary closure as above, adequate approximation achieved, no dehiscence with active flexion and extension of the MCP and interphalangeal joint, no further bleeding  Reassessment: Nurse to apply triple antibiotic ointment and Band-Aid over the site, no immediate complications    MDM   Approximately 3 cm linear laceration to just appear to the right first volar MCP region with no sign of deeper structure or joint involvement, no foreign body, occurred 1 hour prior to arrival, mild oozing but active intact range of motion of the right first MCP joint and interphalangeal joint, consented for primary closure with suture given location near a joint and ongoing oozing with movement at the joint, concerning for delayed healing and increased risk for infection without primary closure  -Patient declined tetanus update  -Primary closure performed following consent and irrigation as above, two sutures placed with adequate approximation, no oozing, no dehiscence on reassessment with active flexion and extension of the joints  -No immediate  complications  -No indication for antibiotics at this time given mechanism of injury, no comorbidities, no prolonged time since injury occurred, and copious irrigation performed  -Suture care discussed  -We discussed wound care, to keep the site covered during the daytime, leave the site open at night to prevent wound suffocation  -We discussed all wounds are at risk for infection even despite closure and copious irrigation as above, and with any redness, fevers, purulent drainage, pain, recommended immediate reassessment  -Discussed limited usage of the right hand to prevent dehiscence  -To follow-up with his primary care physician in 10 to 14 days for assessment for suture removal, sooner if he feels the site is healing more rapidly          Disposition and Plan     Clinical Impression:  1. Laceration of right thumb without foreign body without damage to nail, initial encounter         Disposition:  Discharge  10/2/2024  6:38 pm    Follow-up:    10 to 14 days with his primary care physician for reassessment for suture removal    Medications Prescribed:  Discharge Medication List as of 10/2/2024  6:44 PM          None

## 2024-10-10 ENCOUNTER — OFFICE VISIT (OUTPATIENT)
Dept: FAMILY MEDICINE CLINIC | Facility: CLINIC | Age: 29
End: 2024-10-10
Payer: COMMERCIAL

## 2024-10-10 VITALS
TEMPERATURE: 98 F | OXYGEN SATURATION: 98 % | HEART RATE: 105 BPM | SYSTOLIC BLOOD PRESSURE: 132 MMHG | RESPIRATION RATE: 16 BRPM | DIASTOLIC BLOOD PRESSURE: 86 MMHG

## 2024-10-10 DIAGNOSIS — Z48.02 ENCOUNTER FOR REMOVAL OF SUTURES: Primary | ICD-10-CM

## 2024-10-10 PROBLEM — R13.19 OTHER DYSPHAGIA: Status: ACTIVE | Noted: 2018-11-27

## 2024-10-10 PROBLEM — R11.0 NAUSEA: Status: ACTIVE | Noted: 2018-11-27

## 2024-10-10 PROBLEM — F41.0: Status: ACTIVE | Noted: 2020-05-28

## 2024-10-10 PROCEDURE — 3079F DIAST BP 80-89 MM HG: CPT | Performed by: NURSE PRACTITIONER

## 2024-10-10 PROCEDURE — 3075F SYST BP GE 130 - 139MM HG: CPT | Performed by: NURSE PRACTITIONER

## 2024-10-10 NOTE — PROGRESS NOTES
Tyrone Jordan is a(n) 29 year old year-old male who presents for suture removal. Patient reports sutures were placed in right thumb on 10/2/24.  Denies fever, chills, body aches, drainage from wound, or sign of infection..  PROCEDURE: suture removal.   LOCATION: right thumb, palmar aspect   WOUND EXAM: well healed. No drainage, erythema, warmth,  or signs of infection.   Wound margins well approximated; no dehiscence  2 sutures removed.   DRESSING: band-aid dressing applied with antibiotic ointment.   COMPLICATIONS: no complications  PATIENT STATUS: tolerated well.    Instructed to watch for signs of infection.

## 2024-10-24 ENCOUNTER — OFFICE VISIT (OUTPATIENT)
Dept: INTERNAL MEDICINE CLINIC | Facility: CLINIC | Age: 29
End: 2024-10-24

## 2024-10-24 VITALS
HEART RATE: 66 BPM | BODY MASS INDEX: 29.51 KG/M2 | SYSTOLIC BLOOD PRESSURE: 131 MMHG | HEIGHT: 67 IN | WEIGHT: 188 LBS | DIASTOLIC BLOOD PRESSURE: 86 MMHG

## 2024-10-24 DIAGNOSIS — Z87.19 HISTORY OF ESOPHAGEAL STRICTURE: ICD-10-CM

## 2024-10-24 DIAGNOSIS — F41.9 ANXIETY: ICD-10-CM

## 2024-10-24 DIAGNOSIS — R13.19 OTHER DYSPHAGIA: ICD-10-CM

## 2024-10-24 DIAGNOSIS — Z00.00 ANNUAL PHYSICAL EXAM: Primary | ICD-10-CM

## 2024-10-24 RX ORDER — AZELASTINE 1 MG/ML
2 SPRAY, METERED NASAL 2 TIMES DAILY
Qty: 30 ML | Refills: 0 | Status: SHIPPED | OUTPATIENT
Start: 2024-10-24

## 2024-10-24 NOTE — PROGRESS NOTES
Tyrone Jordan is a 29 year old male.  Chief Complaint   Patient presents with    Physical     HPI:   He presents for his annual physical exam. He has a history of seasonal allergies and dysphagia.     He has a history of anxiety and is taking buspirone as needed. He would like to stop the medication. He has no issues with anxiety.     He has not seen in a PCP in a few years. He was seeing a physician in [place].     He is .     He declines the flu vaccine.     EGD completed 2017 for dysphagia which showed esophageal stricture.   Current Outpatient Medications   Medication Sig Dispense Refill    loratadine-pseudoephedrine ER 5-120 MG Oral Tablet 12 Hr Take 1 tablet by mouth every 12 (twelve) hours. 60 tablet 3    azelastine 0.1 % Nasal Solution 2 sprays by Nasal route 2 (two) times daily. 30 mL 0    busPIRone 5 MG Oral Tab Take by mouth As Directed.        No past medical history on file.   Past Surgical History:   Procedure Laterality Date    Wrist fracture surgery        Social History:  Social History     Socioeconomic History    Marital status:    Tobacco Use    Smoking status: Former     Types: Cigarettes    Smokeless tobacco: Never   Vaping Use    Vaping status: Former   Substance and Sexual Activity    Alcohol use: No    Drug use: Not Currently     Types: Cannabis      Family History   Problem Relation Age of Onset    Colon Polyps Mother     Colon Cancer Paternal Grandmother       Allergies[1]     REVIEW OF SYSTEMS:     Review of Systems   Constitutional:  Negative for fever.   HENT: Negative.     Respiratory:  Negative for cough, shortness of breath and wheezing.    Cardiovascular:  Negative for chest pain.   Gastrointestinal: Negative.    Genitourinary: Negative.    Musculoskeletal: Negative.    Skin: Negative.    Neurological: Negative.    Psychiatric/Behavioral: Negative.        Wt Readings from Last 5 Encounters:   10/24/24 188 lb (85.3 kg)   01/15/19 155 lb (70.3 kg)   09/01/17 160 lb  (72.6 kg)   08/08/17 160 lb 12.8 oz (72.9 kg)     Body mass index is 29.44 kg/m².      EXAM:   /86 (BP Location: Right arm, Patient Position: Sitting, Cuff Size: large)   Pulse 66   Ht 5' 7\" (1.702 m)   Wt 188 lb (85.3 kg)   BMI 29.44 kg/m²     Physical Exam  Vitals reviewed.   Constitutional:       Appearance: Normal appearance.   HENT:      Head: Normocephalic.      Right Ear: Tympanic membrane normal.      Left Ear: Tympanic membrane normal.      Nose: No congestion.      Mouth/Throat:      Pharynx: No posterior oropharyngeal erythema.   Eyes:      Extraocular Movements: Extraocular movements intact.      Pupils: Pupils are equal, round, and reactive to light.   Cardiovascular:      Rate and Rhythm: Normal rate and regular rhythm.      Pulses: Normal pulses.   Pulmonary:      Breath sounds: Normal breath sounds. No wheezing.   Abdominal:      General: Bowel sounds are normal.      Palpations: Abdomen is soft.      Tenderness: There is no abdominal tenderness.   Musculoskeletal:         General: No swelling. Normal range of motion.      Cervical back: Normal range of motion and neck supple.   Skin:     General: Skin is warm and dry.   Neurological:      Mental Status: He is alert and oriented to person, place, and time.   Psychiatric:         Mood and Affect: Mood normal.         Behavior: Behavior normal.            ASSESSMENT AND PLAN:   1. Annual physical exam  - CBC With Differential With Platelet; Future  - Comp Metabolic Panel (14); Future  - Lipid Panel [E]; Future  - TSH W Reflex To Free T4 [E]; Future    2. History of esophageal stricture  - followed with ENT and recommend esophagram. Patient would like another opinion from GI.   - Gastro Referral - [place] ([facility])    3. Other dysphagia  - CPM  - per patient his symptoms have improved with his regimen.   - loratadine-pseudoephedrine ER 5-120 MG Oral Tablet 12 Hr; Take 1 tablet by mouth every 12 (twelve) hours.  Dispense: 60 tablet;  Refill: 3  - Gastro Referral - [place] ([facility])    4. Anxiety  - is currently on buspar  - per patient he would like to discontinue buspar.   - will discontinue and monitor symptoms.       The patient indicates understanding of these issues and agrees to the plan.  Return in about 1 year (around 10/24/2025).         [1]   Allergies  Allergen Reactions    Metoclopramide DYSPHORIA and OTHER (SEE COMMENTS)

## 2024-11-04 ENCOUNTER — LAB ENCOUNTER (OUTPATIENT)
Dept: LAB | Age: 29
End: 2024-11-04
Attending: NURSE PRACTITIONER
Payer: COMMERCIAL

## 2024-11-04 DIAGNOSIS — Z00.00 ANNUAL PHYSICAL EXAM: ICD-10-CM

## 2024-11-04 LAB
ALBUMIN SERPL-MCNC: 5.1 G/DL (ref 3.2–4.8)
ALBUMIN/GLOB SERPL: 2 {RATIO} (ref 1–2)
ALP LIVER SERPL-CCNC: 72 U/L
ALT SERPL-CCNC: 28 U/L
ANION GAP SERPL CALC-SCNC: 7 MMOL/L (ref 0–18)
AST SERPL-CCNC: 24 U/L (ref ?–34)
BASOPHILS # BLD AUTO: 0.03 X10(3) UL (ref 0–0.2)
BASOPHILS NFR BLD AUTO: 0.4 %
BILIRUB SERPL-MCNC: 0.6 MG/DL (ref 0.3–1.2)
BUN BLD-MCNC: 10 MG/DL (ref 9–23)
BUN/CREAT SERPL: 9.9 (ref 10–20)
CALCIUM BLD-MCNC: 10.4 MG/DL (ref 8.7–10.4)
CHLORIDE SERPL-SCNC: 105 MMOL/L (ref 98–112)
CHOLEST SERPL-MCNC: 210 MG/DL (ref ?–200)
CO2 SERPL-SCNC: 30 MMOL/L (ref 21–32)
CREAT BLD-MCNC: 1.01 MG/DL
DEPRECATED RDW RBC AUTO: 37.4 FL (ref 35.1–46.3)
EGFRCR SERPLBLD CKD-EPI 2021: 105 ML/MIN/1.73M2 (ref 60–?)
EOSINOPHIL # BLD AUTO: 0.05 X10(3) UL (ref 0–0.7)
EOSINOPHIL NFR BLD AUTO: 0.7 %
ERYTHROCYTE [DISTWIDTH] IN BLOOD BY AUTOMATED COUNT: 11.9 % (ref 11–15)
FASTING PATIENT LIPID ANSWER: YES
FASTING STATUS PATIENT QL REPORTED: YES
GLOBULIN PLAS-MCNC: 2.6 G/DL (ref 2–3.5)
GLUCOSE BLD-MCNC: 97 MG/DL (ref 70–99)
HCT VFR BLD AUTO: 45.1 %
HDLC SERPL-MCNC: 38 MG/DL (ref 40–59)
HGB BLD-MCNC: 15 G/DL
IMM GRANULOCYTES # BLD AUTO: 0.01 X10(3) UL (ref 0–1)
IMM GRANULOCYTES NFR BLD: 0.1 %
LDLC SERPL CALC-MCNC: 138 MG/DL (ref ?–100)
LYMPHOCYTES # BLD AUTO: 2.53 X10(3) UL (ref 1–4)
LYMPHOCYTES NFR BLD AUTO: 36.7 %
MCH RBC QN AUTO: 28.6 PG (ref 26–34)
MCHC RBC AUTO-ENTMCNC: 33.3 G/DL (ref 31–37)
MCV RBC AUTO: 85.9 FL
MONOCYTES # BLD AUTO: 0.52 X10(3) UL (ref 0.1–1)
MONOCYTES NFR BLD AUTO: 7.5 %
NEUTROPHILS # BLD AUTO: 3.76 X10 (3) UL (ref 1.5–7.7)
NEUTROPHILS # BLD AUTO: 3.76 X10(3) UL (ref 1.5–7.7)
NEUTROPHILS NFR BLD AUTO: 54.6 %
NONHDLC SERPL-MCNC: 172 MG/DL (ref ?–130)
OSMOLALITY SERPL CALC.SUM OF ELEC: 293 MOSM/KG (ref 275–295)
PLATELET # BLD AUTO: 296 10(3)UL (ref 150–450)
POTASSIUM SERPL-SCNC: 4.8 MMOL/L (ref 3.5–5.1)
PROT SERPL-MCNC: 7.7 G/DL (ref 5.7–8.2)
RBC # BLD AUTO: 5.25 X10(6)UL
SODIUM SERPL-SCNC: 142 MMOL/L (ref 136–145)
TRIGL SERPL-MCNC: 186 MG/DL (ref 30–149)
TSI SER-ACNC: 0.79 UIU/ML (ref 0.55–4.78)
VLDLC SERPL CALC-MCNC: 35 MG/DL (ref 0–30)
WBC # BLD AUTO: 6.9 X10(3) UL (ref 4–11)

## 2024-11-04 PROCEDURE — 85025 COMPLETE CBC W/AUTO DIFF WBC: CPT

## 2024-11-04 PROCEDURE — 80061 LIPID PANEL: CPT

## 2024-11-04 PROCEDURE — 80053 COMPREHEN METABOLIC PANEL: CPT

## 2024-11-04 PROCEDURE — 36415 COLL VENOUS BLD VENIPUNCTURE: CPT

## 2024-11-04 PROCEDURE — 84443 ASSAY THYROID STIM HORMONE: CPT

## 2025-01-21 RX ORDER — AZELASTINE 1 MG/ML
2 SPRAY, METERED NASAL 2 TIMES DAILY
Qty: 30 ML | Refills: 3 | Status: SHIPPED | OUTPATIENT
Start: 2025-01-21

## 2025-01-29 ENCOUNTER — PATIENT MESSAGE (OUTPATIENT)
Dept: INTERNAL MEDICINE CLINIC | Facility: CLINIC | Age: 30
End: 2025-01-29

## 2025-01-29 NOTE — TELEPHONE ENCOUNTER
[employer] message sent instructing patient to reach out to his insurance for a preferred/covered alternative medication to loratadine-pseudoephedrine ER 5-120 MG Tb12 .

## 2025-03-11 ENCOUNTER — TELEPHONE (OUTPATIENT)
Facility: CLINIC | Age: 30
End: 2025-03-11

## 2025-03-11 ENCOUNTER — OFFICE VISIT (OUTPATIENT)
Dept: GASTROENTEROLOGY | Facility: CLINIC | Age: 30
End: 2025-03-11

## 2025-03-11 VITALS
HEIGHT: 67 IN | SYSTOLIC BLOOD PRESSURE: 132 MMHG | WEIGHT: 186 LBS | DIASTOLIC BLOOD PRESSURE: 90 MMHG | BODY MASS INDEX: 29.19 KG/M2

## 2025-03-11 DIAGNOSIS — R13.19 ESOPHAGEAL DYSPHAGIA: Primary | ICD-10-CM

## 2025-03-11 DIAGNOSIS — R13.10 DYSPHAGIA, UNSPECIFIED TYPE: Primary | ICD-10-CM

## 2025-03-11 PROCEDURE — 3080F DIAST BP >= 90 MM HG: CPT | Performed by: STUDENT IN AN ORGANIZED HEALTH CARE EDUCATION/TRAINING PROGRAM

## 2025-03-11 PROCEDURE — 3008F BODY MASS INDEX DOCD: CPT | Performed by: STUDENT IN AN ORGANIZED HEALTH CARE EDUCATION/TRAINING PROGRAM

## 2025-03-11 PROCEDURE — 3075F SYST BP GE 130 - 139MM HG: CPT | Performed by: STUDENT IN AN ORGANIZED HEALTH CARE EDUCATION/TRAINING PROGRAM

## 2025-03-11 PROCEDURE — 99204 OFFICE O/P NEW MOD 45 MIN: CPT | Performed by: STUDENT IN AN ORGANIZED HEALTH CARE EDUCATION/TRAINING PROGRAM

## 2025-03-11 NOTE — TELEPHONE ENCOUNTER
1. Schedule upper endoscopy (EGD) with dilation with MAC [Diagnosis: dysphagia]     2. If you start any NEW medication after your visit today, please notify us. Certain medications will need to be held before the procedure, or the procedure cannot be performed.

## 2025-03-11 NOTE — H&P
[facility] - Gastroenterology                                                                                                               Reason for consult: Dysphagia    Requesting physician or provider: Unknown Pcp    Chief Complaint   Patient presents with    Consult     Trouble swallowing; food getting stuck; had EGD in past - issues going on for awhile        HPI:   Tyrone Jordan is a 29 year old year-old man With no significant past medical history who presents to GI clinic with complaint of dysphagia.    In 2018 the patient was having intermittent abdominal discomfort, nausea but no dysphagia.  He underwent an EGD and there was a questionable distal esophageal stricture that was dilated using a through-the-scope balloon to 20 mm.  There was no evidence of mucosal disruption after the dilation.  Unclear if this was clinically relevant.    The patient reports that for the last 2.5 years he has had issues consuming solids but not liquids.  There are no particular foods that do trigger his symptoms.  He he does note that over the last several months he has cut out dairy and he does feel better.    Never has liquid dysphagia.  He has no associated nausea, vomiting, weight loss.    Prior endoscopies:  2018 EGD    Soc:  -no smoking  -yes Etoh    Wt Readings from Last 6 Encounters:   03/11/25 186 lb (84.4 kg)   10/24/24 188 lb (85.3 kg)   01/15/19 155 lb (70.3 kg)   09/01/17 160 lb (72.6 kg)   08/08/17 160 lb 12.8 oz (72.9 kg)        History, Medications, Allergies, ROS:      History reviewed. No pertinent past medical history.   Past Surgical History:   Procedure Laterality Date    Egd      Wrist fracture surgery        Family Hx:   Family History   Problem Relation Age of Onset    Colon Polyps Mother     Colon Cancer Paternal Grandmother       Social History:   Social History     Socioeconomic History    Marital  status:    Tobacco Use    Smoking status: Former     Types: Cigarettes    Smokeless tobacco: Never   Vaping Use    Vaping status: Former   Substance and Sexual Activity    Alcohol use: Yes     Comment: socially    Drug use: Not Currently     Types: Cannabis        Medications (Active prior to today's visit):  Current Outpatient Medications   Medication Sig Dispense Refill    azelastine 0.1 % Nasal Solution 2 sprays by Nasal route 2 (two) times daily. 30 mL 3    loratadine-pseudoephedrine ER 5-120 MG Oral Tablet 12 Hr Take 1 tablet by mouth every 12 (twelve) hours. 60 tablet 3    busPIRone 5 MG Oral Tab Take by mouth As Directed.         Allergies:  Allergies[1]    ROS:   CONSTITUTIONAL:  negative for fevers, rigors  EYES:  negative for diplopia   RESPIRATORY:  negative for severe shortness of breath  CARDIOVASCULAR:  negative for crushing sub-sternal chest pain  GASTROINTESTINAL:  see HPI  GENITOURINARY:  negative for dysuria or gross hematuria  INTEGUMENT/BREAST:  SKIN:  negative for jaundice   ALLERGIC/IMMUNOLOGIC:  negative for hay fever  ENDOCRINE:  negative for cold intolerance and heat intolerance  MUSCULOSKELETAL:  negative for joint effusion/severe erythema  BEHAVIOR/PSYCH:  negative for psychotic behavior      PHYSICAL EXAM:   Blood pressure 132/90, height 5' 7\" (1.702 m), weight 186 lb (84.4 kg).    Gen- Patient appears comfortable and in no acute discomfort  HEENT: the sclera appears anicteric, oropharynx clear, mucus membranes appear moist  CV- regular rate and rhythm, the extremities are warm and well perfused   Lung- Moves air well; No labored breathing  Abdomen- soft, non-tender exam in all quadrants without rigidity or guarding, non-distended  Skin- No jaundice  Ext: no edema is evident.   Neuro- Alert and interactive, and gross movements of extremities normal  Psych - appropriate, non-agitated    Labs/Imaging:     Patient's pertinent labs and imaging were reviewed and discussed with patient  today.      .  ASSESSMENT/PLAN:   Tyrone Jordan is a 29 year old year-old man With no significant past medical history who presents to GI clinic with complaint of dysphagia.    #Dysphagia  2.5-year history of solid food dysphagia in the esophageal phase.  No particular foods that trigger symptoms and no classic symptoms of GERD.    He may have EOE versus peptic stricture.  However, given it the complaints we will try to expedite an EGD with possible dilatation.  We will also take biopsies of the stomach as well as the distal and proximal esophagus to rule out EOE.    Recommendations:  1. Schedule upper endoscopy (EGD) with dilation with MAC [Diagnosis: dysphagia]    2. If you start any NEW medication after your visit today, please notify us. Certain medications will need to be held before the procedure, or the procedure cannot be performed.     3. Gastric biopsies during EGD to rule out h. Pylori.    4. If EGD negative, barium esophagram to be completed.    Orders This Visit:  No orders of the defined types were placed in this encounter.      Meds This Visit:  Requested Prescriptions      No prescriptions requested or ordered in this encounter     Imaging & Referrals:  XR UGI/ESOPHAGUS DOUBLE CONTRAST (LJD=[id number])     Jeyson Juárez MD  [facility]  3/11/2025      This note was partially prepared using Dragon Medical voice recognition dictation software. As a result, errors may occur. When identified, these errors have been corrected. While every attempt is made to correct errors during dictation, discrepancies may still exist.        [1]   Allergies  Allergen Reactions    Metoclopramide DYSPHORIA and OTHER (SEE COMMENTS)

## 2025-03-11 NOTE — TELEPHONE ENCOUNTER
Scheduled for:  EGD 09995 with DIL   Provider Name:  Dr. Juárez   Date:  3/14/2025  Location:    [facility]   Sedation:  Mac  Time:  7:30 (pt is aware that Endo will call the day before to confirm arrival time)  Prep:  NPO after midnight   Meds/Allergies Reconciled?:  Physician reviewed   Diagnosis with codes: Dysphagia R13.10    Was patient informed to call insurance with codes (Y/N):  Yes, I confirmed [employer] insurance with the patient.   Referral sent?:  Referral was sent at the time of electronic surgical scheduling.  EM or [facility] notified?:  I sent an electronic request to Endo Scheduling and received a confirmation today.   Medication Orders:  This patient verbally confirmed that he is not taking:   Iron, blood thinners, BP meds, and is not diabetic   Not latex allergy, Not PCN allergy and does not have a pacemaker  Misc Orders:  I discussed the prep instructions with the patient which he verbally understood and is aware that I will mychart  the instructions today.    Further instructions given by staff:

## 2025-03-11 NOTE — TELEPHONE ENCOUNTER
PPD-  Patient is scheduled for EGD 13928 with Dr. Juárez at [facility] on 3/14/25.   Thank you!

## 2025-03-14 ENCOUNTER — ANESTHESIA (OUTPATIENT)
Dept: ENDOSCOPY | Facility: HOSPITAL | Age: 30
End: 2025-03-14
Payer: COMMERCIAL

## 2025-03-14 ENCOUNTER — HOSPITAL ENCOUNTER (OUTPATIENT)
Facility: HOSPITAL | Age: 30
Setting detail: HOSPITAL OUTPATIENT SURGERY
Discharge: HOME | End: 2025-03-14
Attending: STUDENT IN AN ORGANIZED HEALTH CARE EDUCATION/TRAINING PROGRAM | Admitting: STUDENT IN AN ORGANIZED HEALTH CARE EDUCATION/TRAINING PROGRAM
Payer: COMMERCIAL

## 2025-03-14 ENCOUNTER — ANESTHESIA EVENT (OUTPATIENT)
Dept: ENDOSCOPY | Facility: HOSPITAL | Age: 30
End: 2025-03-14
Payer: COMMERCIAL

## 2025-03-14 VITALS
DIASTOLIC BLOOD PRESSURE: 84 MMHG | WEIGHT: 186 LBS | HEART RATE: 70 BPM | HEIGHT: 67 IN | BODY MASS INDEX: 29.19 KG/M2 | RESPIRATION RATE: 13 BRPM | SYSTOLIC BLOOD PRESSURE: 129 MMHG | OXYGEN SATURATION: 99 %

## 2025-03-14 DIAGNOSIS — R13.10 DYSPHAGIA, UNSPECIFIED TYPE: ICD-10-CM

## 2025-03-14 PROCEDURE — 0D758ZZ DILATION OF ESOPHAGUS, VIA NATURAL OR ARTIFICIAL OPENING ENDOSCOPIC: ICD-10-PCS | Performed by: STUDENT IN AN ORGANIZED HEALTH CARE EDUCATION/TRAINING PROGRAM

## 2025-03-14 PROCEDURE — 43249 ESOPH EGD DILATION <30 MM: CPT | Performed by: STUDENT IN AN ORGANIZED HEALTH CARE EDUCATION/TRAINING PROGRAM

## 2025-03-14 PROCEDURE — 0DB38ZX EXCISION OF LOWER ESOPHAGUS, VIA NATURAL OR ARTIFICIAL OPENING ENDOSCOPIC, DIAGNOSTIC: ICD-10-PCS | Performed by: STUDENT IN AN ORGANIZED HEALTH CARE EDUCATION/TRAINING PROGRAM

## 2025-03-14 PROCEDURE — 43239 EGD BIOPSY SINGLE/MULTIPLE: CPT | Performed by: STUDENT IN AN ORGANIZED HEALTH CARE EDUCATION/TRAINING PROGRAM

## 2025-03-14 PROCEDURE — 0DB18ZX EXCISION OF UPPER ESOPHAGUS, VIA NATURAL OR ARTIFICIAL OPENING ENDOSCOPIC, DIAGNOSTIC: ICD-10-PCS | Performed by: STUDENT IN AN ORGANIZED HEALTH CARE EDUCATION/TRAINING PROGRAM

## 2025-03-14 PROCEDURE — 0DB78ZX EXCISION OF STOMACH, PYLORUS, VIA NATURAL OR ARTIFICIAL OPENING ENDOSCOPIC, DIAGNOSTIC: ICD-10-PCS | Performed by: STUDENT IN AN ORGANIZED HEALTH CARE EDUCATION/TRAINING PROGRAM

## 2025-03-14 RX ORDER — SODIUM CHLORIDE, SODIUM LACTATE, POTASSIUM CHLORIDE, CALCIUM CHLORIDE 600; 310; 30; 20 MG/100ML; MG/100ML; MG/100ML; MG/100ML
INJECTION, SOLUTION INTRAVENOUS CONTINUOUS
Status: DISCONTINUED | OUTPATIENT
Start: 2025-03-14 | End: 2025-03-14

## 2025-03-14 RX ORDER — NALOXONE HYDROCHLORIDE 0.4 MG/ML
0.08 INJECTION, SOLUTION INTRAMUSCULAR; INTRAVENOUS; SUBCUTANEOUS ONCE AS NEEDED
Status: DISCONTINUED | OUTPATIENT
Start: 2025-03-14 | End: 2025-03-14

## 2025-03-14 RX ORDER — LIDOCAINE HYDROCHLORIDE 10 MG/ML
INJECTION, SOLUTION EPIDURAL; INFILTRATION; INTRACAUDAL; PERINEURAL AS NEEDED
Status: DISCONTINUED | OUTPATIENT
Start: 2025-03-14 | End: 2025-03-14 | Stop reason: SURG

## 2025-03-14 RX ADMIN — SODIUM CHLORIDE, SODIUM LACTATE, POTASSIUM CHLORIDE, CALCIUM CHLORIDE: 600; 310; 30; 20 INJECTION, SOLUTION INTRAVENOUS at 08:02:00

## 2025-03-14 RX ADMIN — LIDOCAINE HYDROCHLORIDE 50 MG: 10 INJECTION, SOLUTION EPIDURAL; INFILTRATION; INTRACAUDAL; PERINEURAL at 07:46:00

## 2025-03-14 RX ADMIN — SODIUM CHLORIDE, SODIUM LACTATE, POTASSIUM CHLORIDE, CALCIUM CHLORIDE: 600; 310; 30; 20 INJECTION, SOLUTION INTRAVENOUS at 07:43:00

## 2025-03-14 NOTE — ANESTHESIA POSTPROCEDURE EVALUATION
Patient: Tyrone Jordan    Procedure Summary       Date: 03/14/25 Room / Location: [facility] ENDOSCOPY 04 / [facility] ENDOSCOPY    Anesthesia Start: 0743 Anesthesia Stop:     Procedure: ESOPHAGOGASTRODUODENOSCOPY (EGD) WITH DILATION Diagnosis:       Dysphagia, unspecified type      (esophagitis with dilation)    Surgeons: Jeyson Juárez MD Anesthesiologist: Erica Reese CRNA    Anesthesia Type: general ASA Status: 1            Anesthesia Type: general    Vitals Value Taken Time   /72 03/14/25 0802   Temp N/a 03/14/25 0802   Pulse 92 03/14/25 0802   Resp 19 03/14/25 0802   SpO2 96 % 03/14/25 0802       [facility] AN Post Evaluation:   Patient Evaluated in PACU  Patient Participation: complete - patient participated  Level of Consciousness: sleepy but conscious  Pain Management: adequate  Airway Patency:patent  Yes    Nausea/Vomiting: none  Cardiovascular Status: acceptable  Respiratory Status: acceptable and room air  Postoperative Hydration acceptable      Erica Reese CRNA  3/14/2025 8:02 AM

## 2025-03-14 NOTE — H&P
History & Physical Examination    Patient Name: Tyrone Jordan  MRN: [id number]  CSN: [id number]  YOB: 1995    Diagnosis: Dysphagia.    Prescriptions Prior to Admission[1]  Current Facility-Administered Medications   Medication Dose Route Frequency    lactated ringers infusion   Intravenous Continuous       Allergies: Allergies[2]    Past Medical History:    Anxiety state    Problems with swallowing    PTSD (post-traumatic stress disorder)     Past Surgical History:   Procedure Laterality Date    Egd      Wrist fracture surgery       Family History   Problem Relation Age of Onset    Colon Polyps Mother     Colon Cancer Paternal Grandmother      Social History     Tobacco Use    Smoking status: Former     Types: Cigarettes    Smokeless tobacco: Never   Substance Use Topics    Alcohol use: Yes     Comment: socially       SYSTEM Check if Review is Normal Check if Physical Exam is Normal If not normal, please explain:   HEENT [X ] [ X]    NECK  [X ] [ X]    HEART [X ] [ X]    LUNGS [X ] [ X]    ABDOMEN [X ] [ X]    EXTREMITIES [X ] [ X]    OTHER        I have discussed the risks and benefits and alternatives of the procedure with the patient/family.  They understand and agree to proceed with plan of care.   I have reviewed the History and Physical done within the last 30 days.  Any changes noted above.    Jeyson Juárez MD  [facility]                   [1]   Medications Prior to Admission   Medication Sig Dispense Refill Last Dose/Taking    azelastine 0.1 % Nasal Solution 2 sprays by Nasal route 2 (two) times daily. 30 mL 3 Taking    loratadine-pseudoephedrine ER 5-120 MG Oral Tablet 12 Hr Take 1 tablet by mouth every 12 (twelve) hours. 60 tablet 3 Taking   [2]   Allergies  Allergen Reactions    Metoclopramide DYSPHORIA and OTHER (SEE COMMENTS)

## 2025-03-14 NOTE — OPERATIVE REPORT
ESOPHAGOGASTRODUODENOSCOPY (EGD) REPORT    Tyrone Jordan     1995 Age 29 year old   PCP Unknown Pcp Endoscopist Jeyson Juárez MD       Date of procedure: 25    Procedure: EGD w/ balloon dilation, biopsies    Pre-operative diagnosis: intermittent dysphagia    Post-operative diagnosis: see impression    Medications: MAC    Complications: none    Procedure:  Informed consent was obtained from the patient after the risks of the procedure were discussed, including but not limited to bleeding, perforation, aspiration, infection, or possibility of a missed lesion. After discussions of the risks/benefits and alternatives to this procedure, as well as the planned sedation, the patient was placed in the left lateral decubitus position and begun on continuous blood pressure pulse oximetry and EKG monitoring and this was maintained throughout the procedure. Once an adequate level of sedation was obtained a bite block was placed. Then the lubricated tip of the [id number] diagnostic video upper endoscope was inserted and advanced using direct visualization into the posterior pharynx and ultimately into the esophagus. The scope was then advanced through the stomach and to the second portion of the duodenum.    Complications: None    Findings:      1. Esophagus: The squamocolumnar junction was noted at 38 cm and appeared regular. The diaphragmatic pinch was noted noted at 38 cm from the incisors. The esophageal mucosa appeared healthy and normal. There was no endoscopic findings of esophagitis, stricture or Persaud's esophagus. Gastroscope passed without resistance from the esophagus into the stomach. Using a TTS balloon dilation device, the balloon was inflated to 19 mm without significant mucosal disruption. Biopsies were taken of the distal and proximal esophagus with a cold forceps for histology.    2. Stomach: The stomach distended normally. Normal rugal folds were seen. The pylorus was patent.  Retroflexion revealed a normal fundus. The gastric mucosa appeared healthy and normal. Biopsies were taken with a cold forceps from the antrum, incisura and body for histology.     3. Duodenum: The duodenal mucosa appeared normal in the bulb and 2nd portion of the duodenum.     Impression:  -Esophagus: Unremarkable, no significant stricture. Balloon dilation performed without significant mucosal disruption. Biopsies taken  -Stomach: Unremarkable. Biopsied.  -Duodenum: Unremarkable.    Recommend:  1. Await pathology.  2. Complete barium esophagram with barium tablet protocol.  3. Consider manometry testing versus trial of acid suppression therapy pending result of barium esophagram.    >>>If tissue was sampled/removed and you have not received your pathology results either by phone or letter within 2 weeks, please call our office at [phone].    Specimens:  Gastric, distal/proximal esophagus.    Blood loss: <1 ml

## 2025-03-14 NOTE — ANESTHESIA PREPROCEDURE EVALUATION
Anesthesia PreOp Note    HPI:     Tyrone Jordan is a 29 year old male who presents for preoperative consultation requested by: Jeyson Juárez MD    Date of Surgery: 3/14/2025    Procedure(s):  ESOPHAGOGASTRODUODENOSCOPY (EGD) WITH DILATION  Indication: Dysphagia, unspecified type    Relevant Problems   No relevant active problems       NPO:  Last Liquid Consumption Date: 03/14/25  Last Liquid Consumption Time: 0100  Last Solid Consumption Date: 03/13/25  Last Solid Consumption Time: 2200  Last Liquid Consumption Date: 03/14/25          History Review:  Patient Active Problem List    Diagnosis Date Noted    Complaint of panic attack 05/28/2020    Nausea 11/27/2018    Other dysphagia 11/27/2018       Past Medical History:    Anxiety state    Problems with swallowing    PTSD (post-traumatic stress disorder)       Past Surgical History:   Procedure Laterality Date    Egd      Wrist fracture surgery         Prescriptions Prior to Admission[1]  Current Medications and Prescriptions Ordered in Epic[2]    Allergies[3]    Family History   Problem Relation Age of Onset    Colon Polyps Mother     Colon Cancer Paternal Grandmother      Social History     Socioeconomic History    Marital status:    Tobacco Use    Smoking status: Former     Types: Cigarettes    Smokeless tobacco: Never   Vaping Use    Vaping status: Former   Substance and Sexual Activity    Alcohol use: Yes     Comment: socially    Drug use: Not Currently     Types: Cannabis       Available pre-op labs reviewed.             Vital Signs:  Body mass index is 29.13 kg/m².   height is 1.702 m (5' 7\") and weight is 84.4 kg (186 lb). His blood pressure is 198/89 (abnormal) and his pulse is 100. His respiration is 14 and oxygen saturation is 99%.   Vitals:    03/12/25 1240 03/14/25 0727   BP:  (!) 198/89   Pulse:  100   Resp:  14   SpO2:  99%   Weight: 84.4 kg (186 lb)    Height: 1.702 m (5' 7\")         Anesthesia Evaluation     Patient summary  reviewed and Nursing notes reviewed    No history of anesthetic complications   Airway   Mallampati: I  TM distance: >3 FB  Neck ROM: full  Dental          Pulmonary - negative ROS and normal exam   Cardiovascular - negative ROS and normal exam  Exercise tolerance: good    ROS comment: No diagnosed HTN but presents with high BP today, has hx of anxiety which contributes to the HTN      Neuro/Psych    (+)  anxiety/panic attacks,      (-) seizures, CVA    GI/Hepatic/Renal - negative ROS     Comments: Difficulty swallowing      Endo/Other - negative ROS   Abdominal  - normal exam                 Anesthesia Plan:   ASA:  1  Plan:   General  Informed Consent Plan and Risks Discussed With:  Patient  Discussed plan with:  Surgeon      I have informed Tyrone Finch Julian and/or legal guardian or family member of the nature of the anesthetic plan, benefits, risks including possible dental damage if relevant, major complications, and any alternative forms of anesthetic management.   All of the patient's questions were answered to the best of my ability. The patient desires the anesthetic management as planned.  Erica Reese CRNA  3/14/2025 7:31 AM  Present on Admission:  **None**           [1]   Medications Prior to Admission   Medication Sig Dispense Refill Last Dose/Taking    azelastine 0.1 % Nasal Solution 2 sprays by Nasal route 2 (two) times daily. 30 mL 3 Taking    loratadine-pseudoephedrine ER 5-120 MG Oral Tablet 12 Hr Take 1 tablet by mouth every 12 (twelve) hours. 60 tablet 3 Taking   [2]   Current Facility-Administered Medications Ordered in Epic   Medication Dose Route Frequency Provider Last Rate Last Admin    lactated ringers infusion   Intravenous Continuous Jeyson Juárez MD         No current [facility]-ordered outpatient medications on file.   [3]   Allergies  Allergen Reactions    Metoclopramide DYSPHORIA and OTHER (SEE COMMENTS)

## 2025-03-14 NOTE — DISCHARGE INSTRUCTIONS
Home Care Instructions for Colonoscopy and/or Gastroscopy with Sedation    Diet:  - Resume your regular diet as tolerated unless otherwise instructed.  - Start with light meals to minimize bloating.  - Do not drink alcohol today.    Medication:  - If you have questions about resuming your normal medications, please contact your Primary Care Physician.    Activities:  - Take it easy today. Do not return to work today.  - Do not drive today.  - Do not operate any machinery today (including kitchen equipment).  -     Don't sign any legal documents or make critical decisions.  al pains, oral temperature over 100 degrees Fahrenheit, light-headedness or dizziness, or any other problems, contact your doctor.     This is normal. Gargling with warm salt water (1/2 tsp salt to 1 glass warm water) or using throat lozenges will help.  - If you experience any sharp pain in your neck, abdomen or chest, vomiting of blood, oral temperature over 100 degrees Fahrenheit, light-headedness or dizziness, or any other problems, contact your doctor.    **If unable to reach your doctor, please go to the [facility] Emergency Room**    - Your referring physician will receive a full report of your examination.  - If you do not hear from your doctor's office within two weeks of your biopsy, please call them for your results.

## 2025-03-17 NOTE — PROGRESS NOTES
Biopsies unremarkable.    Normal esophagus and stomach.    Plan for barium esophagram.    Mychart sent.

## 2025-03-24 ENCOUNTER — HOSPITAL ENCOUNTER (OUTPATIENT)
Dept: GENERAL RADIOLOGY | Facility: HOSPITAL | Age: 30
Discharge: HOME | End: 2025-03-24
Attending: STUDENT IN AN ORGANIZED HEALTH CARE EDUCATION/TRAINING PROGRAM
Payer: COMMERCIAL

## 2025-03-24 DIAGNOSIS — R13.19 ESOPHAGEAL DYSPHAGIA: ICD-10-CM

## 2025-03-24 PROCEDURE — 74246 X-RAY XM UPR GI TRC 2CNTRST: CPT | Performed by: STUDENT IN AN ORGANIZED HEALTH CARE EDUCATION/TRAINING PROGRAM

## 2025-03-29 ENCOUNTER — PATIENT MESSAGE (OUTPATIENT)
Dept: GASTROENTEROLOGY | Facility: CLINIC | Age: 30
End: 2025-03-29

## 2025-03-31 ENCOUNTER — TELEPHONE (OUTPATIENT)
Facility: CLINIC | Age: 30
End: 2025-03-31

## 2025-03-31 NOTE — TELEPHONE ENCOUNTER
I called the patient to discuss results of barium esophagram.  It shows there is likely an underlying motility disorder and further investigation is needed with esophageal manometry testing.    There is mention of the tablet getting caught up in the distal esophagus but recent EGD was completely unremarkable with a widely patent distal esophagus.    I left a voicemail and let the patient know we do not have manometry testing came ability adenomatous and he should get this done at a tertiary center.  I wanted to discuss location and referral with him if he calls back please let me know

## (undated) DEVICE — GIJAW SINGLE-USE BIOPSY FORCEPS WITH NEEDLE: Brand: GIJAW

## (undated) DEVICE — V2 SPECIMEN COLLECTION MANIFOLD KIT: Brand: NEPTUNE

## (undated) DEVICE — YANKAUER,BULB TIP,W/O VENT,RIGID,STERILE: Brand: MEDLINE

## (undated) DEVICE — KIT ENDO ORCAPOD 160/180/190

## (undated) DEVICE — MEDI-VAC NON-CONDUCTIVE SUCTION TUBING 6MM X 1.8M (6FT.) L: Brand: CARDINAL HEALTH

## (undated) DEVICE — CONMED SCOPE SAVER BITE BLOCK, 20X27 MM: Brand: SCOPE SAVER

## (undated) DEVICE — KIT CLEAN ENDOKIT 1.1OZ GOWNX2

## (undated) DEVICE — HERCULES 3 STAGE BALLOON ESOPHAGEAL: Brand: HERCULES

## (undated) DEVICE — 60 ML SYRINGE REGULAR TIP: Brand: MONOJECT

## (undated) DEVICE — Device

## (undated) DEVICE — DEVICE INFL 60ML 15ATM PRSS COOK SPHR

## (undated) DEVICE — MEDI-VAC NON-CONDUCTIVE SUCTION TUBING: Brand: CARDINAL HEALTH

## (undated) NOTE — ED AVS SNAPSHOT
Katelyn Valles   MRN: [id number]    Department:  [facility] Emergency Department   Date of Visit:  1/15/2019           Disclosure     Insurance plans vary and the physician(s) referred by the ER may not be covered by your plan.  Please contact CARE PHYSICIAN AT ONCE OR RETURN IMMEDIATELY TO THE EMERGENCY DEPARTMENT. If you have been prescribed any medication(s), please fill your prescription right away and begin taking the medication(s) as directed.   If you believe that any of the medications

## (undated) NOTE — LETTER
[facility]  [address], [place], [place]    Authorization for Surgical Operation and Procedure                               I hereby authorize Jeyson Juárez MD, my physician and his/her assistants (if applicable), which may include medical students, residents, and/or fellows, to perform the following surgical operation/ procedure and administer such anesthesia as may be determined necessary by my physician: Operation/Procedure name (s) ESOPHAGOGASTRODUODENOSCOPY (EGD) WITH DILATION on Tyrone Jordan   2.   I recognize that during the surgical operation/procedure, unforeseen conditions may necessitate additional or different procedures than those listed above.  I, therefore, further authorize and request that the above-named surgeon, assistants, or designees perform such procedures as are, in their judgment, necessary and desirable.    3.   My surgeon/physician has discussed prior to my surgery the potential benefits, risks and side effects of this procedure; the likelihood of achieving goals; and potential problems that might occur during recuperation.  They also discussed reasonable alternatives to the procedure, including risks, benefits, and side effects related to the alternatives and risks related to not receiving this procedure.  I have had all my questions answered and I acknowledge that no guarantee has been made as to the result that may be obtained.    4.   Should the need arise during my operation/procedure, which includes change of level of care prior to discharge, I also consent to the administration of blood and/or blood products.  Further, I understand that despite careful testing and screening of blood or blood products by collecting agencies, I may still be subject to ill effects as a result of receiving a blood transfusion and/or blood products.  The following are some, but not all, of the potential risks that can occur: fever and allergic reactions, hemolytic  reactions, transmission of diseases such as Hepatitis, AIDS and Cytomegalovirus (CMV) and fluid overload.  In the event that I wish to have an autologous transfusion of my own blood, or a directed donor transfusion, I will discuss this with my physician.  Check only if Refusing Blood or Blood Products  I understand refusal of blood or blood products as deemed necessary by my physician may have serious consequences to my condition to include possible death. I hereby assume responsibility for my refusal and release the hospital, its personnel, and my physicians from any responsibility for the consequences of my refusal.    o  Refuse   5.   I authorize the use of any specimen, organs, tissues, body parts or foreign objects that may be removed from my body during the operation/procedure for diagnosis, research or teaching purposes and their subsequent disposal by hospital authorities.  I also authorize the release of specimen test results and/or written reports to my treating physician on the hospital medical staff or other referring or consulting physicians involved in my care, at the discretion of the Pathologist or my treating physician.    6.   I consent to the photographing or videotaping of the operations or procedures to be performed, including appropriate portions of my body for medical, scientific, or educational purposes, provided my identity is not revealed by the pictures or by descriptive texts accompanying them.  If the procedure has been photographed/videotaped, the surgeon will obtain the original picture, image, videotape or CD.  The hospital will not be responsible for storage, release or maintenance of the picture, image, tape or CD.    7.   I consent to the presence of a  or observers in the operating room as deemed necessary by my physician or their designees.    8.   I recognize that in the event my procedure results in extended X-Ray/fluoroscopy time, I may develop a skin  reaction.    9. If I have a Do Not Attempt Resuscitation (DNAR) order in place, that status will be suspended while in the operating room, procedural suite, and during the recovery period unless otherwise explicitly stated by me (or a person authorized to consent on my behalf). The surgeon or my attending physician will determine when the applicable recovery period ends for purposes of reinstating the DNAR order.  10. Patients having a sterilization procedure: I understand that if the procedure is successful the results will be permanent and it will therefore be impossible for me to inseminate, conceive, or bear children.  I also understand that the procedure is intended to result in sterility, although the result has not been guaranteed.   11. I acknowledge that my physician has explained sedation/analgesia administration to me including the risk and benefits I consent to the administration of sedation/analgesia as may be necessary or desirable in the judgment of my physician.    I CERTIFY THAT I HAVE READ AND FULLY UNDERSTAND THE ABOVE CONSENT TO OPERATION and/or OTHER PROCEDURE.     ____________________________________  _________________________________        ______________________________  Signature of Patient    Signature of Responsible Person                Printed Name of Responsible Person                                      ____________________________________  _____________________________                ________________________________  Signature of Witness        Date  Time         Relationship to Patient    STATEMENT OF PHYSICIAN My signature below affirms that prior to the time of the procedure; I have explained to the patient and/or his/her legal representative, the risks and benefits involved in the proposed treatment and any reasonable alternative to the proposed treatment. I have also explained the risks and benefits involved in refusal of the proposed treatment and alternatives to the proposed  treatment and have answered the patient's questions. If I have a significant financial interest in a co-management agreement or a significant financial interest in any product or implant, or other significant relationship used in this procedure/surgery, I have disclosed this and had a discussion with my patient.     _____________________________________________________              _____________________________  (Signature of Physician)                                                                                         (Date)                                   (Time)  Patient Name: Tyrone Finch Julian      : 1995      Printed: 3/11/2025     Medical Record #: [id number]                                      Page 1 of 1

## (undated) NOTE — LETTER
[facility]  Administration of Anesthesia  I, Tyrone Steflucia Jordan agree to be cared for by a physician anesthesiologist alone and/or with a nurse anesthetist, who is specially trained to monitor me and give me medicine to put me to sleep or keep me comfortable during my procedure    I understand that my anesthesiologist and/or anesthetist is not an employee or agent of [facility] or [employer]. He or she works for [facility]    As the patient asking for anesthesia services, I agree to:  Allow the anesthesiologist (anesthesia doctor) to give me medicine and do additional procedures as necessary. Some examples are: Starting or using an “IV” to give me medicine, fluids or blood during my procedure, and having a breathing tube placed to help me breathe when I’m asleep (intubation). In the event that my heart stops working properly, I understand that my anesthesiologist will make every effort to sustain my life, unless otherwise directed by [facility] Do Not Resuscitate documents.  Tell my anesthesia doctor before my procedure:  If I am pregnant.  The last time that I ate or drank.  iii. All of the medicines I take (including prescriptions, herbal supplements, and pills I can buy without a prescription (including street drugs/illegal medications). Failure to inform my anesthesiologist about these medicines may increase my risk of anesthetic complications.  iv.If I am allergic to anything or have had a reaction to anesthesia before.  I understand how the anesthesia medicine will help me (benefits).  I understand that with any type of anesthesia medicine there are risks:  The most common risks are: nausea, vomiting, sore throat, muscle soreness, damage to my eyes, mouth, or teeth (from breathing tube placement).  Rare risks include: remembering what happened during my procedure, allergic reactions to medications, injury to my airway, heart, lungs, vision, nerves, or  muscles and in extremely rare instances death.  My doctor has explained to me other choices available to me for my care (alternatives).  Pregnant Patients (“epidural”):  I understand that the risks of having an epidural (medicine given into my back to help control pain during labor), include itching, low blood pressure, difficulty urinating, headache or slowing of the baby’s heart. Very rare risks include infection, bleeding, seizure, irregular heart rhythms and nerve injury.  Regional Anesthesia (“spinal”, “epidural”, & “nerve blocks”):  I understand that rare but potential complications include headache, bleeding, infection, seizure, irregular heart rhythms, and nerve injury.    _____________________________________________________________________________  Patient (or Representative) Signature/Relationship to Patient  Date   Time    _____________________________________________________________________________   Name (if used)    Language/Organization   Time    _____________________________________________________________________________  Nurse Anesthetist Signature     Date   Time  _____________________________________________________________________________  Anesthesiologist Signature     Date   Time  I have discussed the procedure and information above with the patient (or patient’s representative) and answered their questions. The patient or their representative has agreed to have anesthesia services.    _____________________________________________________________________________  Witness        Date   Time  I have verified that the signature is that of the patient or patient’s representative, and that it was signed before the procedure  Patient Name: Tyrone Jordan     : 1995                 Printed: 3/11/2025 at 1:43 PM    Medical Record #: [id number]                                            Page 1 of 1  ----------ANESTHESIA CONSENT----------